# Patient Record
Sex: FEMALE | Race: WHITE | NOT HISPANIC OR LATINO | Employment: UNEMPLOYED | URBAN - METROPOLITAN AREA
[De-identification: names, ages, dates, MRNs, and addresses within clinical notes are randomized per-mention and may not be internally consistent; named-entity substitution may affect disease eponyms.]

---

## 2018-04-11 ENCOUNTER — TELEPHONE (OUTPATIENT)
Dept: CARDIOLOGY | Facility: CLINIC | Age: 43
End: 2018-04-11

## 2018-04-11 DIAGNOSIS — R00.2 PALPITATION: Primary | ICD-10-CM

## 2018-04-11 NOTE — TELEPHONE ENCOUNTER
----- Message from Ania Alvarez sent at 4/11/2018  2:27 PM CDT -----  Regarding: EKG Order  Please order in for EKG pt scheduled 4/19/18    Thanks

## 2018-04-18 ENCOUNTER — OFFICE VISIT (OUTPATIENT)
Dept: CARDIOLOGY | Facility: CLINIC | Age: 43
End: 2018-04-18
Payer: COMMERCIAL

## 2018-04-18 VITALS
SYSTOLIC BLOOD PRESSURE: 113 MMHG | HEART RATE: 68 BPM | HEIGHT: 67 IN | BODY MASS INDEX: 28.34 KG/M2 | DIASTOLIC BLOOD PRESSURE: 73 MMHG | WEIGHT: 180.56 LBS

## 2018-04-18 DIAGNOSIS — R94.31 ABNORMAL RESTING ECG FINDINGS: ICD-10-CM

## 2018-04-18 DIAGNOSIS — R07.9 CHEST PAIN, UNSPECIFIED TYPE: ICD-10-CM

## 2018-04-18 DIAGNOSIS — I25.5 ISCHEMIC CARDIOMYOPATHY: ICD-10-CM

## 2018-04-18 DIAGNOSIS — Z95.5 S/P CORONARY ARTERY STENT PLACEMENT: ICD-10-CM

## 2018-04-18 DIAGNOSIS — I25.10 CORONARY ARTERY DISEASE INVOLVING NATIVE CORONARY ARTERY OF NATIVE HEART WITHOUT ANGINA PECTORIS: ICD-10-CM

## 2018-04-18 DIAGNOSIS — I25.2 H/O ACUTE MYOCARDIAL INFARCTION OF ANTERIOR WALL: ICD-10-CM

## 2018-04-18 PROCEDURE — 93000 ELECTROCARDIOGRAM COMPLETE: CPT | Mod: S$GLB,,, | Performed by: INTERNAL MEDICINE

## 2018-04-18 PROCEDURE — 99999 PR PBB SHADOW E&M-EST. PATIENT-LVL III: CPT | Mod: PBBFAC,,, | Performed by: INTERNAL MEDICINE

## 2018-04-18 PROCEDURE — 99204 OFFICE O/P NEW MOD 45 MIN: CPT | Mod: S$GLB,,, | Performed by: INTERNAL MEDICINE

## 2018-04-18 RX ORDER — PANTOPRAZOLE SODIUM 40 MG/1
40 TABLET, DELAYED RELEASE ORAL DAILY
COMMUNITY

## 2018-04-18 RX ORDER — ASPIRIN 325 MG
325 TABLET, DELAYED RELEASE (ENTERIC COATED) ORAL DAILY
COMMUNITY

## 2018-04-18 NOTE — PROGRESS NOTES
Subjective:    Patient ID:  Chrissy Stephens is a 42 y.o. female who presents for evaluation of Coronary Artery Disease (establish care- Dr. Mayo)      Pt here to establish care. At age 39 she had an acute anterior MI in 11/2014 thought due to plaque rupture and received a VERA to the proximal LAD. She had no risk factors for CAD and has done relatively well since except she is unhappy with some weight gain and CAN which will occur with trying to exercise. She reports some recent ankle swelling and the SOB seems to be getting worse. She had an Echo in [place] 3 weeks ago showing EF 45% with akinetic anterior wall c/w previous MI. She denies any chest pain, PND, orthopnea. She denies any palpitations, dizziness, syncope or pre-syncope.        Review of Systems   Constitution: Positive for weight gain. Negative for weight loss.   HENT: Negative.    Eyes: Negative.    Cardiovascular: Positive for dyspnea on exertion and leg swelling. Negative for chest pain, claudication, cyanosis, irregular heartbeat, near-syncope, orthopnea (no PND), palpitations and syncope.   Respiratory: Positive for shortness of breath. Negative for cough, hemoptysis and snoring.    Endocrine: Negative.    Skin: Negative.    Musculoskeletal: Negative for joint pain, muscle cramps, muscle weakness and myalgias.   Gastrointestinal: Negative for diarrhea, hematemesis, nausea and vomiting.   Genitourinary: Negative.    Neurological: Negative for dizziness, focal weakness, light-headedness, loss of balance, numbness, paresthesias and seizures.   Psychiatric/Behavioral: Negative.         Objective:    Physical Exam   Constitutional: She is oriented to person, place, and time. She appears well-developed and well-nourished.   Eyes: Pupils are equal, round, and reactive to light.   Neck: Normal range of motion. No thyromegaly present.   Cardiovascular: Normal rate, regular rhythm, S1 normal, S2 normal, normal heart sounds, intact distal pulses and  normal pulses.   No extrasystoles are present. PMI is not displaced.  Exam reveals no friction rub.    No murmur heard.  Pulmonary/Chest: Effort normal and breath sounds normal. She has no wheezes. She has no rales. She exhibits no tenderness.   Abdominal: Soft. Bowel sounds are normal. She exhibits no distension and no mass. There is no tenderness.   Musculoskeletal: Normal range of motion. She exhibits no edema.   Neurological: She is alert and oriented to person, place, and time.   Skin: Skin is warm and dry.   Vitals reviewed.      Test(s) Reviewed  I have reviewed the following in detail:  [x] Stress test   [x] Angiography   [x] Echocardiogram   [x] Labs   [x] Other:  Hospital records        Assessment:       1. Coronary artery disease involving native coronary artery of native heart without angina pectoris    2. H/O acute myocardial infarction of anterior wall    3. S/P coronary artery stent placement - LAD 11/2014    4. Ischemic cardiomyopathy - EF 45%    5. Abnormal resting ECG findings         Plan:       Exercise SPECT stress  We discussed her symptoms, pervious event, reduced EF and overall prognosis  We discussed diet, exercise and weight loss  We discussed reducing sodium in the diet  F/u 1-2 months

## 2018-04-23 DIAGNOSIS — R07.9 CHEST PAIN, UNSPECIFIED TYPE: Primary | ICD-10-CM

## 2018-05-24 ENCOUNTER — CLINICAL SUPPORT (OUTPATIENT)
Dept: CARDIOLOGY | Facility: CLINIC | Age: 43
End: 2018-05-24
Attending: INTERNAL MEDICINE
Payer: COMMERCIAL

## 2018-05-24 ENCOUNTER — HOSPITAL ENCOUNTER (OUTPATIENT)
Dept: RADIOLOGY | Facility: HOSPITAL | Age: 43
Discharge: HOME | End: 2018-05-24
Attending: INTERNAL MEDICINE
Payer: COMMERCIAL

## 2018-05-24 VITALS — BODY MASS INDEX: 28.25 KG/M2 | WEIGHT: 180 LBS | HEIGHT: 67 IN

## 2018-05-24 DIAGNOSIS — I25.2 H/O ACUTE MYOCARDIAL INFARCTION OF ANTERIOR WALL: ICD-10-CM

## 2018-05-24 DIAGNOSIS — Z95.5 S/P CORONARY ARTERY STENT PLACEMENT: ICD-10-CM

## 2018-05-24 DIAGNOSIS — I25.10 CORONARY ARTERY DISEASE INVOLVING NATIVE CORONARY ARTERY OF NATIVE HEART WITHOUT ANGINA PECTORIS: ICD-10-CM

## 2018-05-24 DIAGNOSIS — R94.31 ABNORMAL RESTING ECG FINDINGS: ICD-10-CM

## 2018-05-24 DIAGNOSIS — I25.5 ISCHEMIC CARDIOMYOPATHY: ICD-10-CM

## 2018-05-24 PROCEDURE — 78452 HT MUSCLE IMAGE SPECT MULT: CPT | Mod: 26,,, | Performed by: INTERNAL MEDICINE

## 2018-05-24 PROCEDURE — 93018 CV STRESS TEST I&R ONLY: CPT | Mod: ,,, | Performed by: INTERNAL MEDICINE

## 2018-05-24 PROCEDURE — 93017 CV STRESS TEST TRACING ONLY: CPT | Mod: PO

## 2018-05-24 PROCEDURE — 99999 PR PBB SHADOW E&M-EST. PATIENT-LVL I: CPT | Mod: PBBFAC,,,

## 2018-05-24 PROCEDURE — 93016 CV STRESS TEST SUPVJ ONLY: CPT | Mod: ,,, | Performed by: INTERNAL MEDICINE

## 2018-05-24 PROCEDURE — A9502 TC99M TETROFOSMIN: HCPCS | Mod: PO

## 2018-05-25 LAB
CV STRESS PEAK BP: NORMAL MMHG
STRESS ECHO POST ESTIMATED WORKLOAD: 11 METS
STRESS ECHO POST EXERCISE DUR MIN: 6 MIN
STRESS ECHO POST EXERCISE DUR SEC: 45

## 2018-05-28 ENCOUNTER — OFFICE VISIT (OUTPATIENT)
Dept: CARDIOLOGY | Facility: CLINIC | Age: 43
End: 2018-05-28
Payer: COMMERCIAL

## 2018-05-28 VITALS
WEIGHT: 178.81 LBS | DIASTOLIC BLOOD PRESSURE: 62 MMHG | SYSTOLIC BLOOD PRESSURE: 100 MMHG | BODY MASS INDEX: 28.07 KG/M2 | HEIGHT: 67 IN | HEART RATE: 76 BPM

## 2018-05-28 DIAGNOSIS — I25.5 ISCHEMIC CARDIOMYOPATHY: ICD-10-CM

## 2018-05-28 DIAGNOSIS — I25.10 CORONARY ARTERY DISEASE INVOLVING NATIVE CORONARY ARTERY OF NATIVE HEART WITHOUT ANGINA PECTORIS: Primary | ICD-10-CM

## 2018-05-28 DIAGNOSIS — I25.2 H/O ACUTE MYOCARDIAL INFARCTION OF ANTERIOR WALL: ICD-10-CM

## 2018-05-28 DIAGNOSIS — Z95.5 S/P CORONARY ARTERY STENT PLACEMENT: ICD-10-CM

## 2018-05-28 PROCEDURE — 3008F BODY MASS INDEX DOCD: CPT | Mod: CPTII,S$GLB,, | Performed by: INTERNAL MEDICINE

## 2018-05-28 PROCEDURE — 99214 OFFICE O/P EST MOD 30 MIN: CPT | Mod: S$GLB,,, | Performed by: INTERNAL MEDICINE

## 2018-05-28 PROCEDURE — 99999 PR PBB SHADOW E&M-EST. PATIENT-LVL III: CPT | Mod: PBBFAC,,, | Performed by: INTERNAL MEDICINE

## 2018-05-28 RX ORDER — NITROGLYCERIN 0.4 MG/1
0.4 TABLET SUBLINGUAL EVERY 5 MIN PRN
Qty: 25 TABLET | Refills: 1 | Status: SHIPPED | OUTPATIENT
Start: 2018-05-28 | End: 2019-05-28

## 2018-05-28 NOTE — PROGRESS NOTES
Subjective:    Patient ID:  Chrissy Stephens is a 42 y.o. female who presents for follow-up of Coronary Artery Disease (follow up test results)      Pt here for f/u. She  had an acute anterior MI in 11/2014 thought due to plaque rupture and received a VERA to the proximal LAD. We ordered a spect stress which showed fixed anterior wall defect c/w previous event.         Review of Systems   Cardiovascular: Positive for dyspnea on exertion and leg swelling (occasional).   Respiratory: Positive for shortness of breath.         Objective:    Physical Exam   Constitutional: She is oriented to person, place, and time. She appears well-developed and well-nourished.   Eyes: Pupils are equal, round, and reactive to light.   Neck: Normal range of motion. No thyromegaly present.   Cardiovascular: Normal rate, regular rhythm, S1 normal, S2 normal, normal heart sounds, intact distal pulses and normal pulses.   No extrasystoles are present. PMI is not displaced.  Exam reveals no friction rub.    No murmur heard.  Pulmonary/Chest: Effort normal and breath sounds normal. She has no wheezes. She has no rales. She exhibits no tenderness.   Abdominal: Soft. Bowel sounds are normal. She exhibits no distension and no mass. There is no tenderness.   Musculoskeletal: Normal range of motion. She exhibits no edema.   Neurological: She is alert and oriented to person, place, and time.   Skin: Skin is warm and dry.   Vitals reviewed.        Assessment:       1. Coronary artery disease involving native coronary artery of native heart without angina pectoris    2. H/O acute myocardial infarction of anterior wall    3. Ischemic cardiomyopathy - EF 45%    4. S/P coronary artery stent placement - LAD 11/2014         Plan:       We discussed her symptoms, pervious event, reduced EF and overall prognosis  She has not tolerated previous attempts at B-blockers or HF meds due to hypotension  We again discussed diet, exercise and weight loss  We again  discussed reducing sodium in the diet  F/u 6 months

## 2018-06-25 ENCOUNTER — OFFICE VISIT (OUTPATIENT)
Dept: CARDIOLOGY | Facility: CLINIC | Age: 43
End: 2018-06-25
Payer: COMMERCIAL

## 2018-06-25 ENCOUNTER — LAB VISIT (OUTPATIENT)
Dept: LAB | Facility: HOSPITAL | Age: 43
End: 2018-06-25
Attending: INTERNAL MEDICINE
Payer: COMMERCIAL

## 2018-06-25 VITALS
HEART RATE: 70 BPM | DIASTOLIC BLOOD PRESSURE: 70 MMHG | HEIGHT: 67 IN | BODY MASS INDEX: 28.27 KG/M2 | WEIGHT: 180.13 LBS | SYSTOLIC BLOOD PRESSURE: 103 MMHG

## 2018-06-25 DIAGNOSIS — I25.2 H/O ACUTE MYOCARDIAL INFARCTION OF ANTERIOR WALL: ICD-10-CM

## 2018-06-25 DIAGNOSIS — I25.10 CORONARY ARTERY DISEASE INVOLVING NATIVE CORONARY ARTERY OF NATIVE HEART WITHOUT ANGINA PECTORIS: ICD-10-CM

## 2018-06-25 DIAGNOSIS — I25.5 ISCHEMIC CARDIOMYOPATHY: ICD-10-CM

## 2018-06-25 DIAGNOSIS — Z95.5 S/P CORONARY ARTERY STENT PLACEMENT: ICD-10-CM

## 2018-06-25 LAB
ALBUMIN SERPL BCP-MCNC: 3.7 G/DL
ALP SERPL-CCNC: 63 U/L
ALT SERPL W/O P-5'-P-CCNC: 16 U/L
ANION GAP SERPL CALC-SCNC: 6 MMOL/L
AST SERPL-CCNC: 18 U/L
BASOPHILS # BLD AUTO: 0.04 K/UL
BASOPHILS NFR BLD: 0.5 %
BILIRUB SERPL-MCNC: 0.6 MG/DL
BNP SERPL-MCNC: 127 PG/ML
BUN SERPL-MCNC: 8 MG/DL
CALCIUM SERPL-MCNC: 9.1 MG/DL
CHLORIDE SERPL-SCNC: 106 MMOL/L
CO2 SERPL-SCNC: 27 MMOL/L
CREAT SERPL-MCNC: 0.8 MG/DL
DIFFERENTIAL METHOD: NORMAL
EOSINOPHIL # BLD AUTO: 0 K/UL
EOSINOPHIL NFR BLD: 0.5 %
ERYTHROCYTE [DISTWIDTH] IN BLOOD BY AUTOMATED COUNT: 12.3 %
EST. GFR  (AFRICAN AMERICAN): >60 ML/MIN/1.73 M^2
EST. GFR  (NON AFRICAN AMERICAN): >60 ML/MIN/1.73 M^2
GLUCOSE SERPL-MCNC: 92 MG/DL
HCT VFR BLD AUTO: 42.5 %
HGB BLD-MCNC: 14 G/DL
IMM GRANULOCYTES # BLD AUTO: 0.03 K/UL
IMM GRANULOCYTES NFR BLD AUTO: 0.3 %
LYMPHOCYTES # BLD AUTO: 2.8 K/UL
LYMPHOCYTES NFR BLD: 32.1 %
MCH RBC QN AUTO: 30.2 PG
MCHC RBC AUTO-ENTMCNC: 32.9 G/DL
MCV RBC AUTO: 92 FL
MONOCYTES # BLD AUTO: 0.4 K/UL
MONOCYTES NFR BLD: 4.3 %
NEUTROPHILS # BLD AUTO: 5.4 K/UL
NEUTROPHILS NFR BLD: 62.3 %
NRBC BLD-RTO: 0 /100 WBC
PLATELET # BLD AUTO: 217 K/UL
PMV BLD AUTO: 11 FL
POTASSIUM SERPL-SCNC: 4.6 MMOL/L
PROT SERPL-MCNC: 6.9 G/DL
RBC # BLD AUTO: 4.63 M/UL
SODIUM SERPL-SCNC: 139 MMOL/L
WBC # BLD AUTO: 8.7 K/UL

## 2018-06-25 PROCEDURE — 99999 PR PBB SHADOW E&M-EST. PATIENT-LVL III: CPT | Mod: PBBFAC,,, | Performed by: INTERNAL MEDICINE

## 2018-06-25 PROCEDURE — 85025 COMPLETE CBC W/AUTO DIFF WBC: CPT

## 2018-06-25 PROCEDURE — 80053 COMPREHEN METABOLIC PANEL: CPT

## 2018-06-25 PROCEDURE — 3008F BODY MASS INDEX DOCD: CPT | Mod: CPTII,S$GLB,, | Performed by: INTERNAL MEDICINE

## 2018-06-25 PROCEDURE — 83880 ASSAY OF NATRIURETIC PEPTIDE: CPT

## 2018-06-25 PROCEDURE — 99214 OFFICE O/P EST MOD 30 MIN: CPT | Mod: S$GLB,,, | Performed by: INTERNAL MEDICINE

## 2018-06-25 PROCEDURE — 36415 COLL VENOUS BLD VENIPUNCTURE: CPT | Mod: PO

## 2018-06-25 NOTE — PROGRESS NOTES
Subjective:    Patient ID:  Chrissy Stephens is a 42 y.o. female who presents for follow-up of Coronary Artery Disease and Shortness of Breath      Pt seen last month with unremarkable SPECT other than scar from previous event. Last week her family all came down with a GI illness and she had 3 days of nausea, vomiting and diarrhea. She is still a bit under the weather with some dizziness and SOB. Due to her previous heart issues she felt she needed to be checked.        Review of Systems   Constitution: Negative for weight gain and weight loss.   HENT: Negative.    Eyes: Negative.    Cardiovascular: Negative for chest pain, claudication, cyanosis, dyspnea on exertion, irregular heartbeat, leg swelling, near-syncope, orthopnea (no PND), palpitations and syncope.   Respiratory: Positive for shortness of breath. Negative for cough, hemoptysis and snoring.    Endocrine: Negative.    Skin: Negative.    Musculoskeletal: Negative for joint pain, muscle cramps, muscle weakness and myalgias.   Gastrointestinal: Positive for diarrhea, nausea and vomiting. Negative for hematemesis.   Genitourinary: Negative.    Neurological: Positive for dizziness and light-headedness. Negative for focal weakness, loss of balance, numbness, paresthesias and seizures.   Psychiatric/Behavioral: Negative.         Objective:    Physical Exam   Constitutional: She is oriented to person, place, and time. She appears well-developed and well-nourished.   Eyes: Pupils are equal, round, and reactive to light.   Neck: Normal range of motion. No thyromegaly present.   Cardiovascular: Normal rate, regular rhythm, S1 normal, S2 normal, normal heart sounds, intact distal pulses and normal pulses.   No extrasystoles are present. PMI is not displaced.  Exam reveals no friction rub.    No murmur heard.  Pulmonary/Chest: Effort normal and breath sounds normal. She has no wheezes. She has no rales. She exhibits no tenderness.   Abdominal: Soft. Bowel sounds are  normal. She exhibits no distension and no mass. There is no tenderness.   Musculoskeletal: Normal range of motion. She exhibits no edema.   Neurological: She is alert and oriented to person, place, and time.   Skin: Skin is warm and dry.   Vitals reviewed.        Assessment:       1. Coronary artery disease involving native coronary artery of native heart without angina pectoris    2. H/O acute myocardial infarction of anterior wall    3. Ischemic cardiomyopathy - EF 45%    4. S/P coronary artery stent placement - LAD 11/2014         Plan:       Doubt any new cardiac consequences of the recent GI illness.   Likely a bit dehydrated   CMP, BNP. CBC  Repeat Echo  Has recall in system

## 2018-06-26 ENCOUNTER — CLINICAL SUPPORT (OUTPATIENT)
Dept: CARDIOLOGY | Facility: CLINIC | Age: 43
End: 2018-06-26
Attending: INTERNAL MEDICINE
Payer: COMMERCIAL

## 2018-06-26 VITALS
HEART RATE: 68 BPM | HEIGHT: 67 IN | WEIGHT: 180 LBS | SYSTOLIC BLOOD PRESSURE: 120 MMHG | DIASTOLIC BLOOD PRESSURE: 76 MMHG | BODY MASS INDEX: 28.25 KG/M2

## 2018-06-26 DIAGNOSIS — Z95.5 S/P CORONARY ARTERY STENT PLACEMENT: ICD-10-CM

## 2018-06-26 DIAGNOSIS — I25.10 CORONARY ARTERY DISEASE INVOLVING NATIVE CORONARY ARTERY OF NATIVE HEART WITHOUT ANGINA PECTORIS: ICD-10-CM

## 2018-06-26 DIAGNOSIS — I25.2 H/O ACUTE MYOCARDIAL INFARCTION OF ANTERIOR WALL: ICD-10-CM

## 2018-06-26 DIAGNOSIS — I25.5 ISCHEMIC CARDIOMYOPATHY: ICD-10-CM

## 2018-06-26 PROCEDURE — 93306 TTE W/DOPPLER COMPLETE: CPT | Mod: S$GLB,,, | Performed by: INTERNAL MEDICINE

## 2018-06-26 PROCEDURE — 99999 PR PBB SHADOW E&M-EST. PATIENT-LVL II: CPT | Mod: PBBFAC,,,

## 2018-06-27 ENCOUNTER — TELEPHONE (OUTPATIENT)
Dept: CARDIOLOGY | Facility: CLINIC | Age: 43
End: 2018-06-27

## 2018-06-27 LAB
ASCENDING AORTA: 1.88 CM
AV MEAN GRADIENT: 3.34 MMHG
AV PEAK GRADIENT: 5.68 MMHG
AV VALVE AREA: 2.03 CM2
BSA FOR ECHO PROCEDURE: 1.96 M2
CV ECHO LV RWT: 0.39 CM
DOP CALC AO PEAK VEL: 1.19 M/S
DOP CALC AO VTI: 27.08 CM
DOP CALC LVOT AREA: 2.98 CM2
DOP CALC LVOT DIAMETER: 1.95 CM
DOP CALC LVOT STROKE VOLUME: 54.86 CM3
DOP CALCLVOT PEAK VEL VTI: 18.38 CM
E WAVE DECELERATION TIME: 184.95 MSEC
E/A RATIO: 1.27
E/E' RATIO: 8.94
ECHO LV POSTERIOR WALL: 0.91 CM (ref 0.6–1.1)
FRACTIONAL SHORTENING: 24 % (ref 28–44)
INTERVENTRICULAR SEPTUM: 0.96 CM (ref 0.6–1.1)
IVRT: 0.08 MSEC
LA MAJOR: 5.22 CM
LA MINOR: 4.82 CM
LA WIDTH: 3.71 CM
LEFT ATRIUM SIZE: 2.99 CM
LEFT ATRIUM VOLUME INDEX: 24.1 ML/M2
LEFT ATRIUM VOLUME: 47.26 CM3
LEFT INTERNAL DIMENSION IN SYSTOLE: 3.61 CM (ref 2.1–4)
LEFT VENTRICLE MASS INDEX: 77.7 G/M2
LEFT VENTRICULAR INTERNAL DIMENSION IN DIASTOLE: 4.74 CM (ref 3.5–6)
LEFT VENTRICULAR MASS: 152.28 G
LV LATERAL E/E' RATIO: 9.5
LV SEPTAL E/E' RATIO: 8.44
MV PEAK A VEL: 0.6 M/S
MV PEAK E VEL: 0.76 M/S
MV STENOSIS PRESSURE HALF TIME: 53.64 MS
MV VALVE AREA P 1/2 METHOD: 4.1 CM2
PISA TR MAX VEL: 1.95 M/S
PULM VEIN S/D RATIO: 0.83
PV PEAK D VEL: 0.48 M/S
PV PEAK S VEL: 0.4 M/S
RA MAJOR: 4.34 CM
RA PRESSURE: 3 MMHG
RA WIDTH: 4.1 CM
RIGHT VENTRICULAR END-DIASTOLIC DIMENSION: 3.62 CM
SINUS: 2.58 CM
STJ: 2.24 CM
TDI LATERAL: 0.08
TDI SEPTAL: 0.09
TDI: 0.09
TR MAX PG: 15.21 MMHG
TRICUSPID ANNULAR PLANE SYSTOLIC EXCURSION: 0.02 CM
TV REST PULMONARY ARTERY PRESSURE: 18.21 MMHG

## 2018-06-27 NOTE — TELEPHONE ENCOUNTER
Test(s) Reviewed  I have reviewed the following in detail:  [] Stress test   [] Angiography   [x] Echocardiogram   [x] Labs   [] Other:       Call Pt and tell her tests appear unchanged from previous  Echo appears unchanged from previous description   Would rather no make any changes at this time  If anything changes or worsens please call

## 2018-06-28 ENCOUNTER — TELEPHONE (OUTPATIENT)
Dept: CARDIOLOGY | Facility: CLINIC | Age: 43
End: 2018-06-28

## 2018-06-28 NOTE — TELEPHONE ENCOUNTER
----- Message from Gregoria Lebron sent at 6/28/2018  2:45 PM CDT -----  Contact: Self  Patient is requesting a call back in regards to the results of her ECHO test done on 6/26 in [facility] along with her labs.  Call back [phone] (home)

## 2018-12-10 ENCOUNTER — TELEPHONE (OUTPATIENT)
Dept: CARDIOLOGY | Facility: CLINIC | Age: 43
End: 2018-12-10

## 2018-12-10 NOTE — TELEPHONE ENCOUNTER
----- Message from Chrissy Yates sent at 12/10/2018  7:39 AM CST -----  Contact: pt [phone]   Call placed to pod. Patient called this morning stating she had shortness of Breath and swelling in her hands and feet and felt fatigue the agent did try several attempts to reach the On Call Nurse. Unable to reach the Nurse. The patient did inform us she had a Heart attack 4 yrs ago. I did inform the patient to go to the ER if she was having these  Symptoms. She informed me that she just wanted to make an appointment with Dr. Hodges.